# Patient Record
Sex: FEMALE | Race: WHITE | NOT HISPANIC OR LATINO | Employment: OTHER | URBAN - METROPOLITAN AREA
[De-identification: names, ages, dates, MRNs, and addresses within clinical notes are randomized per-mention and may not be internally consistent; named-entity substitution may affect disease eponyms.]

---

## 2024-06-04 ENCOUNTER — DOCUMENTATION (OUTPATIENT)
Dept: ENDOCRINOLOGY | Facility: CLINIC | Age: 74
End: 2024-06-04

## 2024-06-04 ENCOUNTER — OFFICE VISIT (OUTPATIENT)
Dept: ENDOCRINOLOGY | Facility: CLINIC | Age: 74
End: 2024-06-04
Payer: MEDICARE

## 2024-06-04 VITALS
HEART RATE: 65 BPM | OXYGEN SATURATION: 95 % | HEIGHT: 63 IN | BODY MASS INDEX: 42.52 KG/M2 | DIASTOLIC BLOOD PRESSURE: 80 MMHG | WEIGHT: 240 LBS | SYSTOLIC BLOOD PRESSURE: 138 MMHG

## 2024-06-04 DIAGNOSIS — E11.65 TYPE 2 DIABETES MELLITUS WITH HYPERGLYCEMIA, WITH LONG-TERM CURRENT USE OF INSULIN: Primary | ICD-10-CM

## 2024-06-04 DIAGNOSIS — Z79.4 TYPE 2 DIABETES MELLITUS WITH HYPERGLYCEMIA, WITH LONG-TERM CURRENT USE OF INSULIN: Primary | ICD-10-CM

## 2024-06-04 LAB
EXPIRATION DATE: ABNORMAL
EXPIRATION DATE: ABNORMAL
GLUCOSE BLDC GLUCOMTR-MCNC: 146 MG/DL (ref 70–130)
HBA1C MFR BLD: 7.1 % (ref 4.5–5.7)
Lab: ABNORMAL
Lab: ABNORMAL

## 2024-06-04 PROCEDURE — 95251 CONT GLUC MNTR ANALYSIS I&R: CPT | Performed by: PHYSICIAN ASSISTANT

## 2024-06-04 PROCEDURE — 3051F HG A1C>EQUAL 7.0%<8.0%: CPT | Performed by: PHYSICIAN ASSISTANT

## 2024-06-04 PROCEDURE — 83036 HEMOGLOBIN GLYCOSYLATED A1C: CPT | Performed by: PHYSICIAN ASSISTANT

## 2024-06-04 PROCEDURE — 1160F RVW MEDS BY RX/DR IN RCRD: CPT | Performed by: PHYSICIAN ASSISTANT

## 2024-06-04 PROCEDURE — 99213 OFFICE O/P EST LOW 20 MIN: CPT | Performed by: PHYSICIAN ASSISTANT

## 2024-06-04 PROCEDURE — 1159F MED LIST DOCD IN RCRD: CPT | Performed by: PHYSICIAN ASSISTANT

## 2024-06-04 RX ORDER — ESCITALOPRAM OXALATE 10 MG/1
10 TABLET ORAL DAILY
COMMUNITY
Start: 2024-05-15

## 2024-06-04 NOTE — PROGRESS NOTES
Office Note      Date: 2024  Patient Name: Dee Dee Maldonado  MRN: [id number]  : 1950    Chief Complaint   Patient presents with    Diabetes     Type 2 diabetes mellitus with hypoglycemia without coma, with long-term current use of insulin         History of Present Illness:   Dee Dee Maldonado is a 74 y.o. female who presents for follow-up for type 2 diabetes diagnosed in .  She was hospitalized in [place] in  and was diagnosed with DKA during that hospitalization.  She initially saw Dr. Barillas.  Her insurance changed and was not excepted by [facility], [place] so she has been seeing her primary care physician for follow-ups.  She is using the Dexcom continuous glucose monitor.  She is taking Tresiba 15 units daily and NovoLog 5 units with breakfast, 6 units with lunch and 9 units with supper plus an additional 1 unit for every 50 mg/dL her blood glucose is above 150 mg/dL.  She and her  report they were concerned about a severe low blood sugar in the 40s recently on her clarity peggy.  Patient reports she is not feeling poorly but her Dexcom alarmed.  She has noted increased swelling in her feet the last couple of weeks.  She has a primary care appointment in 2 weeks for monitoring.  She typically wears compression stockings but has not been wearing these in the warmer weather.      Subjective     Review of Systems:   Review of Systems   Constitutional: Negative.    Cardiovascular:  Positive for leg swelling.   Gastrointestinal: Negative.    Endocrine: Negative.    Neurological: Negative.        The following portions of the patient's history were reviewed and updated as appropriate: allergies, current medications, past family history, past medical history, past social history, past surgical history, and problem list.    Objective     Vitals:    24 1512   BP: 138/80   BP Location: Left arm   Patient Position: Sitting   Cuff Size: Adult   Pulse: 65   SpO2: 95%   Weight: 109 kg (240 lb)   Height:  "160 cm (63\")     Body mass index is 42.51 kg/m².    Physical Exam  Vitals reviewed.   Constitutional:       General: She is not in acute distress.     Appearance: Normal appearance.   Cardiovascular:      Pulses:           Dorsalis pedis pulses are 2+ on the right side and 2+ on the left side.        Posterior tibial pulses are 2+ on the right side and 2+ on the left side.   Musculoskeletal:      Right foot: No deformity.      Left foot: No deformity.   Feet:      Right foot:      Protective Sensation: 5 sites tested.  4 sites sensed.      Skin integrity: Skin integrity normal.      Toenail Condition: Right toenails are normal.      Left foot:      Protective Sensation: 5 sites tested.  4 sites sensed.      Skin integrity: Skin integrity normal.      Toenail Condition: Left toenails are normal.      Comments: 1+ edema bilaterally  Mild reduction in light touch sensation on the great toes bilaterally  Diabetic Foot Exam Performed and Monofilament Test Performed      Neurological:      Mental Status: She is alert.         HEMOGLOBIN A1C  Lab Results   Component Value Date    HGBA1C 7.1 (A) 06/04/2024       GLUCOSE  Glucose   Date Value Ref Range Status   06/04/2024 146 (A) 70 - 130 mg/dL Final         Assessment / Plan      Assessment & Plan:  1. Type 2 diabetes mellitus with hyperglycemia, with long-term current use of insulin  Her hemoglobin A1c is up as compared to June at 7.1%.  This is acceptable.  I reviewed her Dexcom download.  Her average glucose is 153 mg/dL with 77% of her readings within range, 21% high, 1% very high, less than 1% low and less than 1% very low.  In reviewing her Dexcom readings it looks like the low blood sugar she experienced in the middle of the night may have been a compression low.  I confirmed with this with the diabetes educator as well.  We discussed using a fingerstick to monitor blood glucose readings if she ha has concerns her Dexcom may not be accurate.  She reports she does " have test strips available to use at home.  We discussed keeping carbohydrates consistent throughout the day.  She likes to eat dessert at night.  We discussed adding 1 unit NovoLog to her evening dose if she is eating dessert to prevent hyperglycemia in the evenings.  Her systolic blood pressure is up some today.  She has an appointment coming up with her primary care physician who adjust her medication.  Her foot exam did show mild swelling we discussed checking in with her primary care physician regarding this.  - POC Glycosylated Hemoglobin (Hb A1C)  - POC Glucose, Blood      Return in about 3 months (around 9/4/2024) for Recheck 3-4 mos- sees Dr. Barillas-- please schedule with her.     This note was dictated using Dragon voice recognition.    Electronically signed by: JUAN JOSE Sexton  06/04/2024

## 2024-10-09 ENCOUNTER — OFFICE VISIT (OUTPATIENT)
Dept: ENDOCRINOLOGY | Facility: CLINIC | Age: 74
End: 2024-10-09
Payer: MEDICARE

## 2024-10-09 ENCOUNTER — TELEPHONE (OUTPATIENT)
Dept: ENDOCRINOLOGY | Facility: CLINIC | Age: 74
End: 2024-10-09

## 2024-10-09 VITALS
HEART RATE: 80 BPM | WEIGHT: 238.2 LBS | BODY MASS INDEX: 42.21 KG/M2 | OXYGEN SATURATION: 97 % | HEIGHT: 63 IN | DIASTOLIC BLOOD PRESSURE: 80 MMHG | SYSTOLIC BLOOD PRESSURE: 132 MMHG

## 2024-10-09 DIAGNOSIS — E78.5 HYPERLIPIDEMIA, UNSPECIFIED HYPERLIPIDEMIA TYPE: ICD-10-CM

## 2024-10-09 DIAGNOSIS — Z79.4 TYPE 2 DIABETES MELLITUS WITH HYPERGLYCEMIA, WITH LONG-TERM CURRENT USE OF INSULIN: Primary | ICD-10-CM

## 2024-10-09 DIAGNOSIS — E11.65 TYPE 2 DIABETES MELLITUS WITH HYPERGLYCEMIA, WITH LONG-TERM CURRENT USE OF INSULIN: Primary | ICD-10-CM

## 2024-10-09 LAB
EXPIRATION DATE: ABNORMAL
EXPIRATION DATE: ABNORMAL
GLUCOSE BLDC GLUCOMTR-MCNC: 165 MG/DL (ref 70–130)
HBA1C MFR BLD: 6.6 % (ref 4.5–5.7)
Lab: ABNORMAL
Lab: ABNORMAL

## 2024-10-09 RX ORDER — INSULIN ASPART 100 [IU]/ML
INJECTION, SOLUTION INTRAVENOUS; SUBCUTANEOUS
Qty: 15 ML | Refills: 5
Start: 2024-10-09

## 2024-10-09 RX ORDER — FLASH GLUCOSE SENSOR
KIT MISCELLANEOUS
COMMUNITY
Start: 2024-09-05

## 2024-10-09 NOTE — PROGRESS NOTES
"Chief Complaint   Patient presents with    Diabetes        HPI   Dee Dee Maldonado is a 74 y.o. female had concerns including Diabetes.      Patient presents for follow-up of diabetes.  She was last seen by CARMELO Feliciano in June 2024.    Patient was to follow-up with diabetes  Current diabetes medications include the following:  Tresiba 13 units daily  NovoLog 5 units at breakfast, 8 units with lunch, 16 units with dinner plus correction 1 per 50 greater than 150, she reports she is using correctional insulin intermittently, not generally based on  Premeal glucose.    Patient is monitoring blood glucose using Dexcom.  She does report occasional low glucose alarm during the evening but is not symptomatic during these episodes.    Patient is taking atorvastatin 20 mg daily for hyperlipidemia.  She does report she has had interval labs with her PCP.    The following portions of the patient's history were reviewed and updated as appropriate: allergies, current medications, and past social history.    Review of Systems   Constitutional:  Negative for unexpected weight loss.   Endocrine: Negative for polydipsia and polyuria.      /80   Pulse 80   Ht 160 cm (62.99\")   Wt 108 kg (238 lb 3.2 oz)   SpO2 97%   BMI 42.21 kg/m²      Physical Exam      Constitutional:  well developed; well nourished  no acute distress  appears stated age   ENT/Thyroid: not examined   Eyes: Conjunctiva: clear   Respiratory:  breathing is unlabored  clear to auscultation bilaterally   Cardiovascular:  regular rate and rhythm   Chest:  Not performed.   Abdomen: Not performed.   : Not performed.   Musculoskeletal: Not performed   Skin: not performed.   Neuro: mental status, speech normal   Psych: mood and affect are within normal limits       Labs/Imaging  Glucose:  Lab Results   Component Value Date    POCGLU 165 (A) 10/09/2024     HbA1c:  Lab Results   Component Value Date    HGBA1C 6.6 (A) 10/09/2024    HGBA1C 7.1 (A) 06/04/2024     Dexcom " downloaded for review for dates ranging September 26 through October 9, 2024, average glucose is 163 with standard deviation of 49, GMI 7.2%.  68% of data is within target range, 1% low, 25% high, 6% very high.  Patient has predominantly postprandial hyperglycemia.  Single instance of hypoglycemia noted which followed hyperglycemia.  Likely due to overcorrection per history.    Diagnoses and all orders for this visit:    1. Type 2 diabetes mellitus with hyperglycemia, with long-term current use of insulin (Primary)  -     POC Glucose, Blood  -     POC Glycosylated Hemoglobin (Hb A1C)  Hemoglobin A1c is at goal, 6.6%  CGM containing 72 hours of glucose data was downloaded and reviewed  Discussed with patient that asymptomatic lows alerts overnight could be due to sensor compression.  Patient was instructed to verify with fingerstick if asymptomatic, she does have supplies to do so.  Patient with rare incidence of hypoglycemia following hyperglycemia. Per history this is likely due to timing and utilization of correctional insulin and possible insulin stacking.  We did discuss appropriate management of hypoglycemia.  Glucagon injection sent to pharmacy.  Patient will continue Tresiba 13 units daily  Patient will continue NovoLog 5 units with breakfast, 8 units with lunch, 16 units with dinner.  She will continue correction 1 per 50 greater than 150.  Discussed appropriate utilization of correctional insulin, this should be used based on Premeal sugar and correctional insulin should not be repeated within 4 hours of most recent dose due to risk of insulin stacking and subsequent hyperglycemia.  Patient reports interval labs with PCP, will request report    2. Hyperlipidemia, unspecified hyperlipidemia type  Patient will continue atorvastatin 20 mg daily.  Requesting labs.    Other orders  -     glucagon (GLUCAGEN) 1 MG injection; Inject 1 mg under the skin into the appropriate area as directed See Admin Instructions.  Follow package directions for low blood sugar.  Dispense: 1 kit; Refill: 1  -     insulin degludec (TRESIBA FLEXTOUCH) 100 UNIT/ML solution pen-injector injection; Inject 13 Units under the skin into the appropriate area as directed Daily.  Dispense: 15 mL; Refill: 1  -     insulin aspart (NovoLOG FlexPen) 100 UNIT/ML solution pen-injector sc pen; For use at mealtimes and per correctional scale, MD D50 units  Dispense: 15 mL; Refill: 5      Addendum dated 10/11/2024  Received outside labs from PCP   Labs dated 12/4/2023  Urine microalbumin to creatinine ratio 77  CMP with EGFR 48  Alkaline phosphatase 79  AST 16  ALT 13  Total cholesterol 175  Triglycerides 95  LDL 91    Return in about 4 months (around 2/9/2025) for Next scheduled follow up. The patient was instructed to contact the clinic with any interval questions or concerns.    Electronically signed by: Letha Barillas MD   Endocrinologist    Dictated Utilizing Dragon Dictation

## 2024-10-09 NOTE — TELEPHONE ENCOUNTER
----- Message from Letha Barillas sent at 10/9/2024  1:13 PM EDT -----  Please request copy of interval labs (CMP, lipids, urine microalbumin) from patient's PCP

## 2024-10-11 LAB — MICROALBUMIN/CREAT UR: 77 MG/G (ref 0–29)

## 2024-12-18 ENCOUNTER — TRANSCRIBE ORDERS (OUTPATIENT)
Dept: GENERAL RADIOLOGY | Facility: CLINIC | Age: 74
End: 2024-12-18
Payer: MEDICARE

## 2024-12-18 DIAGNOSIS — M54.50 ACUTE LOW BACK PAIN, UNSPECIFIED BACK PAIN LATERALITY, UNSPECIFIED WHETHER SCIATICA PRESENT: Primary | ICD-10-CM

## 2024-12-18 DIAGNOSIS — M25.552 LEFT HIP PAIN: ICD-10-CM

## 2025-02-13 ENCOUNTER — OFFICE VISIT (OUTPATIENT)
Dept: ENDOCRINOLOGY | Facility: CLINIC | Age: 75
End: 2025-02-13
Payer: MEDICARE

## 2025-02-13 VITALS
SYSTOLIC BLOOD PRESSURE: 134 MMHG | HEART RATE: 64 BPM | WEIGHT: 242 LBS | DIASTOLIC BLOOD PRESSURE: 82 MMHG | BODY MASS INDEX: 42.88 KG/M2 | OXYGEN SATURATION: 95 % | HEIGHT: 63 IN

## 2025-02-13 DIAGNOSIS — Z79.4 TYPE 2 DIABETES MELLITUS WITH HYPERGLYCEMIA, WITH LONG-TERM CURRENT USE OF INSULIN: Primary | ICD-10-CM

## 2025-02-13 DIAGNOSIS — E78.5 HYPERLIPIDEMIA, UNSPECIFIED HYPERLIPIDEMIA TYPE: ICD-10-CM

## 2025-02-13 DIAGNOSIS — E11.65 TYPE 2 DIABETES MELLITUS WITH HYPERGLYCEMIA, WITH LONG-TERM CURRENT USE OF INSULIN: Primary | ICD-10-CM

## 2025-02-13 LAB
EXPIRATION DATE: ABNORMAL
EXPIRATION DATE: ABNORMAL
GLUCOSE BLDC GLUCOMTR-MCNC: 192 MG/DL (ref 70–130)
HBA1C MFR BLD: 7.1 % (ref 4.5–5.7)
Lab: ABNORMAL
Lab: ABNORMAL

## 2025-02-13 PROCEDURE — 80053 COMPREHEN METABOLIC PANEL: CPT | Performed by: INTERNAL MEDICINE

## 2025-02-13 RX ORDER — INSULIN ASPART 100 [IU]/ML
INJECTION, SOLUTION INTRAVENOUS; SUBCUTANEOUS
Qty: 45 ML | Refills: 1 | Status: SHIPPED | OUTPATIENT
Start: 2025-02-13

## 2025-02-13 RX ORDER — ACYCLOVIR 400 MG/1
TABLET ORAL
COMMUNITY
End: 2025-02-13

## 2025-02-13 RX ORDER — ACYCLOVIR 400 MG/1
TABLET ORAL
COMMUNITY

## 2025-02-13 NOTE — PROGRESS NOTES
"Chief Complaint   Patient presents with    Diabetes        HPI   Dee Dee Maldonado is a 75 y.o. female had concerns including Diabetes.      Patient presents for follow-up of diabetes.     Patient is monitoring glucose using Dexcom.  She reports ongoing overnight alarms.    Current diabetes medications include the following:  Tresiba 13 units daily  NovoLog 5 units with breakfast, 8 units with lunch, 16 units with dinner plus correction 1 per 50 greater than 150.  Patient has not routinely been using correctional insulin.    Patient is taking atorvastatin 20 mg daily for hyperlipidemia.  Patient had interval labs with primary care which were reviewed via patient portal.     The following portions of the patient's history were reviewed and updated as appropriate: allergies, current medications, and past social history.    Review of Systems   Constitutional:  Negative for activity change and appetite change.      /82   Pulse 64   Ht 160 cm (62.99\")   Wt 110 kg (242 lb)   SpO2 95%   BMI 42.88 kg/m²      Physical Exam      Constitutional:  well developed; well nourished  no acute distress  obese - Body mass index is 42.88 kg/m².   ENT/Thyroid: not examined   Eyes: Conjunctiva: clear   Respiratory:  breathing is unlabored  clear to auscultation bilaterally   Cardiovascular:  regular rate and rhythm   Chest:  Not performed.   Abdomen: Not performed.   : Not performed.   Musculoskeletal: Not performed   Skin: not performed.   Neuro: mental status, speech normal   Psych: mood and affect are within normal limits       Labs/Imaging  A1C:  Lab Results   Component Value Date    HGBA1C 7.1 (A) 02/13/2025    HGBA1C 6.6 (A) 10/09/2024      Glucose:  Lab Results   Component Value Date    POCGLU 192 (A) 02/13/2025      Labs dated 12/4/2023  Urine microalbumin to creatinine ratio 77  CMP with EGFR 48  Alkaline phosphatase 79  AST 16  ALT 13  Total cholesterol 175  Triglycerides 95  LDL 91    1/13/25  Total cholesterol " 194  Triglycerides 124      11/6/24  Albumin to creatinine ratio 68    Dexcom downloaded for review for dates ranging January 31 through February 13, 2025, average glucose is 186 with GMI 7.8%, standard deviation 52.  57% of data is within target range, 30% high, 13% very high.  CGM is active 95% of the time there is no pattern of hypoglycemia.  Recent overnight alerts are due to signal loss.  Patient has postprandial hyperglycemia.      Diagnoses and all orders for this visit:    1. Type 2 diabetes mellitus with hyperglycemia, with long-term current use of insulin (Primary)  -     POC Glycosylated Hemoglobin (Hb A1C)  -     POC Glucose, Blood  -     Comprehensive Metabolic Panel; Future  Diabetes is uncontrolled with hemoglobin A1c 7.1%.    CGM containing 72 hours of glucose data was downloaded and reviewed  Discussed with patient there is no hypoglycemia noted on CGM for the past 2 weeks.  She was instructed to contact the office if this is occurring so that we may download Dexcom to investigate further.  Hyperglycemia is postprandial.  Patient will change NovoLog to 8 units with breakfast and lunch, 18 units with dinner.  She was encouraged to use correctional insulin Premeal, 1 unit per 50 greater than 150.  We also discussed this can be utilized every 4 hours if persistently hyperglycemic when not eating.  Lipid panel reviewed from January 2025  Urine albumin to creatinine ratio reviewed from November 2024  CMP is due and was ordered today  Discussed recommendations for annual screenings in patients with diabetes.    2. Hyperlipidemia, unspecified hyperlipidemia type  Most recent lipid panel with LDL slightly above target, 104.  We did review that this is increased in comparison to prior testing.  Patient will continue atorvastatin 20 mg daily, discuss potential need to increase dose if value remains above goal on future testing.    Other orders  -     insulin degludec (TRESIBA FLEXTOUCH) 100 UNIT/ML  solution pen-injector injection; Inject 13 Units under the skin into the appropriate area as directed Daily.  Dispense: 15 mL; Refill: 1  -     insulin aspart (NovoLOG FlexPen) 100 UNIT/ML solution pen-injector sc pen; For use at mealtimes and per correctional scale, MDD 50 units  Dispense: 45 mL; Refill: 1         Return in about 3 months (around 5/13/2025) for Next scheduled follow up. The patient was instructed to contact the clinic with any interval questions or concerns.    Electronically signed by: Letha Barillas MD   Endocrinologist    Dictated Utilizing Dragon Dictation

## 2025-02-14 LAB
ALBUMIN SERPL-MCNC: 3.9 G/DL (ref 3.5–5.2)
ALBUMIN/GLOB SERPL: 1.1 G/DL
ALP SERPL-CCNC: 101 U/L (ref 39–117)
ALT SERPL W P-5'-P-CCNC: 10 U/L (ref 1–33)
ANION GAP SERPL CALCULATED.3IONS-SCNC: 10.8 MMOL/L (ref 5–15)
AST SERPL-CCNC: 17 U/L (ref 1–32)
BILIRUB SERPL-MCNC: <0.2 MG/DL (ref 0–1.2)
BUN SERPL-MCNC: 27 MG/DL (ref 8–23)
BUN/CREAT SERPL: 18.1 (ref 7–25)
CALCIUM SPEC-SCNC: 9.3 MG/DL (ref 8.6–10.5)
CHLORIDE SERPL-SCNC: 104 MMOL/L (ref 98–107)
CO2 SERPL-SCNC: 23.2 MMOL/L (ref 22–29)
CREAT SERPL-MCNC: 1.49 MG/DL (ref 0.57–1)
EGFRCR SERPLBLD CKD-EPI 2021: 36.9 ML/MIN/1.73
GLOBULIN UR ELPH-MCNC: 3.5 GM/DL
GLUCOSE SERPL-MCNC: 159 MG/DL (ref 65–99)
POTASSIUM SERPL-SCNC: 4.2 MMOL/L (ref 3.5–5.2)
PROT SERPL-MCNC: 7.4 G/DL (ref 6–8.5)
SODIUM SERPL-SCNC: 138 MMOL/L (ref 136–145)

## 2025-02-17 ENCOUNTER — TELEPHONE (OUTPATIENT)
Dept: ENDOCRINOLOGY | Facility: CLINIC | Age: 75
End: 2025-02-17
Payer: MEDICARE

## 2025-02-17 NOTE — TELEPHONE ENCOUNTER
Please call back on Tuesday  [phone]  Dr Barillas changed her medication to lispro to Aspart at her last appt  They have 8 boxes of the lispro can she finish this before starting the aspart    Please call them back

## 2025-02-18 NOTE — TELEPHONE ENCOUNTER
Please contact patient.  She may finish her current supply of short acting insulin before transitioning to the new prescription.

## 2025-02-22 ENCOUNTER — PATIENT MESSAGE (OUTPATIENT)
Dept: ENDOCRINOLOGY | Facility: CLINIC | Age: 75
End: 2025-02-22
Payer: MEDICARE

## 2025-06-03 ENCOUNTER — OFFICE VISIT (OUTPATIENT)
Dept: ENDOCRINOLOGY | Facility: CLINIC | Age: 75
End: 2025-06-03
Payer: MEDICARE

## 2025-06-03 VITALS
OXYGEN SATURATION: 95 % | HEART RATE: 80 BPM | BODY MASS INDEX: 42.7 KG/M2 | HEIGHT: 63 IN | DIASTOLIC BLOOD PRESSURE: 82 MMHG | SYSTOLIC BLOOD PRESSURE: 136 MMHG | WEIGHT: 241 LBS

## 2025-06-03 DIAGNOSIS — E11.65 TYPE 2 DIABETES MELLITUS WITH HYPERGLYCEMIA, WITH LONG-TERM CURRENT USE OF INSULIN: Primary | ICD-10-CM

## 2025-06-03 DIAGNOSIS — E78.5 HYPERLIPIDEMIA, UNSPECIFIED HYPERLIPIDEMIA TYPE: ICD-10-CM

## 2025-06-03 DIAGNOSIS — Z79.4 TYPE 2 DIABETES MELLITUS WITH HYPERGLYCEMIA, WITH LONG-TERM CURRENT USE OF INSULIN: Primary | ICD-10-CM

## 2025-06-03 LAB
EXPIRATION DATE: ABNORMAL
EXPIRATION DATE: ABNORMAL
GLUCOSE BLDC GLUCOMTR-MCNC: 195 MG/DL (ref 70–130)
HBA1C MFR BLD: 7.3 % (ref 4.5–5.7)
Lab: ABNORMAL
Lab: ABNORMAL

## 2025-06-03 PROCEDURE — 83036 HEMOGLOBIN GLYCOSYLATED A1C: CPT | Performed by: INTERNAL MEDICINE

## 2025-06-03 PROCEDURE — 99214 OFFICE O/P EST MOD 30 MIN: CPT | Performed by: INTERNAL MEDICINE

## 2025-06-03 PROCEDURE — 95251 CONT GLUC MNTR ANALYSIS I&R: CPT | Performed by: INTERNAL MEDICINE

## 2025-06-03 PROCEDURE — 3051F HG A1C>EQUAL 7.0%<8.0%: CPT | Performed by: INTERNAL MEDICINE

## 2025-06-03 RX ORDER — INSULIN ASPART 100 [IU]/ML
INJECTION, SOLUTION INTRAVENOUS; SUBCUTANEOUS
Qty: 45 ML | Refills: 1 | Status: SHIPPED | OUTPATIENT
Start: 2025-06-03

## 2025-06-03 NOTE — PROGRESS NOTES
"Chief Complaint   Patient presents with    Diabetes        HPI   Dee Dee Maldonado is a 75 y.o. female had concerns including Diabetes.      Patient presents for follow-up.    Current diabetes medications include the following:  Tresiba 13 units daily-patient does report some downtrending glucose values overnight and states she keeps orange juice by her bedside for when she gets alarms for lows.  NovoLog 8 units with breakfast and lunch, 18 units with dinner plus correction 1 unit per 50 greater than 150.  Of note, patient reports she does not always eat 3 meals daily and frequently her first meal of the day is around midday.    Patient is monitoring glucose using freestyle mallika.  She will soon be resuming use of Dexcom CGM.    Patient is taking atorvastatin 20 mg daily for hyperlipidemia.    The following portions of the patient's history were reviewed and updated as appropriate: allergies, current medications, and past social history.    Review of Systems   Constitutional:  Negative for activity change.      /82   Pulse 80   Ht 160 cm (62.99\")   Wt 109 kg (241 lb)   SpO2 95%   BMI 42.70 kg/m²      Physical Exam      Constitutional:  well developed; well nourished  no acute distress  appears stated age   ENT/Thyroid: not examined   Eyes: Conjunctiva: clear   Respiratory:  breathing is unlabored  clear to auscultation bilaterally   Cardiovascular:  regular rate and rhythm   Chest:  Not performed.   Abdomen: Not performed.   : Not performed.   Musculoskeletal: Not performed   Skin: not performed.   Neuro: mental status, speech normal   Psych: mood and affect are within normal limits       Labs/Imaging  A1C:  Lab Results   Component Value Date    HGBA1C 7.3 (A) 06/03/2025    HGBA1C 7.1 (A) 02/13/2025      Glucose:  Lab Results   Component Value Date    POCGLU 195 (A) 06/03/2025      CMP:  Lab Results   Component Value Date    GLUCOSE 159 (H) 02/13/2025    BUN 27 (H) 02/13/2025    CREATININE 1.49 (H) " 02/13/2025     02/13/2025    K 4.2 02/13/2025     02/13/2025    CALCIUM 9.3 02/13/2025    PROTEINTOT 7.4 02/13/2025    ALBUMIN 3.9 02/13/2025    ALT 10 02/13/2025    AST 17 02/13/2025    ALKPHOS 101 02/13/2025    BILITOT <0.2 02/13/2025    GLOB 3.5 02/13/2025    AGRATIO 1.1 02/13/2025    BCR 18.1 02/13/2025    ANIONGAP 10.8 02/13/2025    EGFR 36.9 (L) 02/13/2025      Labs dated 4/2/2025  eGFR 47    1/13/25  Total cholesterol 194  Triglycerides 124       11/6/24  Albumin to creatinine ratio 68    Freestyle mallika downloaded for review for dates ranging May 21 through Marleen 3, 2025, CGM is active 78% of the time with average glucose 161, GMI 7.2%.  71% of data within target range, 1% low, 22% high, 6% very high.  Patient has postprandial hyperglycemia which is most prominent around midday.  She has occasional low glucose values overnight.    Diagnoses and all orders for this visit:    1. Type 2 diabetes mellitus with hyperglycemia, with long-term current use of insulin (Primary)  -     POC Glucose, Blood  -     POC Glycosylated Hemoglobin (Hb A1C)  Diabetes is uncontrolled with hemoglobin A1c 7.3%, this is worsened in comparison to prior.  Patient is hyperglycemic during office visit  CGM containing 72 hours of glucose data was downloaded for review  Patient will reduce Tresiba to 11 units daily given occasional overnight hypoglycemia  Patient will increase NovoLog to 10 units with breakfast/lunch, continue 18 units with dinner plus correction 1 per 50 greater than 150  Discussed importance of moderation of dietary carbohydrates.  Lipid panel is up-to-date from January 2025  Urine albumin to creatinine ratio is up-to-date from November 2024  BMP is up-to-date from April 2025    2. Hyperlipidemia, unspecified hyperlipidemia type  Lipid panel is up-to-date from January 2024, LDL slightly above target.  Patient will continue atorvastatin.       Return in about 3 months (around 9/3/2025) for Next  scheduled follow up. The patient was instructed to contact the clinic with any interval questions or concerns.    Electronically signed by: Letha Barillas MD   Endocrinologist    Dictated Utilizing Dragon Dictation

## 2025-06-18 ENCOUNTER — TELEPHONE (OUTPATIENT)
Dept: CARDIAC SURGERY | Facility: CLINIC | Age: 75
End: 2025-06-18
Payer: MEDICARE

## 2025-06-18 NOTE — TELEPHONE ENCOUNTER
Received this referral for pt from [facility] as a new consult on 5/22, have been talking to referring office since 5/22 on getting an HERB for pt before we see her in office. Referring office had a hard time getting a hold pt for order.    S/w referring office, Ms. Maldonado was seen in there office on 6/17 for f/u and sent an order for HERB to another location for test to be scheduled. I have recommended that the office to refax referral with test once the patient has that done she v/u. I have canceled referral for now.

## 2025-06-19 ENCOUNTER — TRANSCRIBE ORDERS (OUTPATIENT)
Dept: CARDIOLOGY | Facility: CLINIC | Age: 75
End: 2025-06-19
Payer: MEDICARE

## 2025-06-19 DIAGNOSIS — I87.312 CHRONIC VENOUS HYPERTENSION (IDIOPATHIC) WITH ULCER OF LEFT LOWER EXTREMITY (CODE): Primary | ICD-10-CM
